# Patient Record
Sex: FEMALE | Race: BLACK OR AFRICAN AMERICAN | NOT HISPANIC OR LATINO | URBAN - METROPOLITAN AREA
[De-identification: names, ages, dates, MRNs, and addresses within clinical notes are randomized per-mention and may not be internally consistent; named-entity substitution may affect disease eponyms.]

---

## 2018-01-09 ENCOUNTER — OFFICE (OUTPATIENT)
Dept: URBAN - METROPOLITAN AREA CLINIC 11 | Facility: CLINIC | Age: 18
End: 2018-01-09

## 2018-01-09 VITALS
WEIGHT: 145 LBS | SYSTOLIC BLOOD PRESSURE: 134 MMHG | DIASTOLIC BLOOD PRESSURE: 87 MMHG | RESPIRATION RATE: 16 BRPM | HEART RATE: 92 BPM | HEIGHT: 59 IN

## 2018-01-09 DIAGNOSIS — R11.0 NAUSEA: ICD-10-CM

## 2018-01-09 DIAGNOSIS — R10.9 UNSPECIFIED ABDOMINAL PAIN: ICD-10-CM

## 2018-01-09 PROCEDURE — 99201 OFFICE OUTPATIENT NEW 10 MINUTES: CPT

## 2018-01-09 NOTE — SERVICENOTES
Will call back in 2 weeks with update.  If responds to medication, refills will be given at that time.

## 2018-01-09 NOTE — SERVICEHPINOTES
This 17-year-old  girl comes in with a several month history of recurring abdominal pains and nausea.  The nausea seems to come either after meals or when she is under pressure.  She never vomits, appetite is good but she does have bouts of constipation.  She also has asthma  and bronchitis.  She does take allergy medication and apparently also takes Zofran 4 milligrams p.r.n. nausea.  That medicine seems to work very well and controls her nausea.  Her abdominal pain can come at any time and she describes it in varying ways.  It could be cramping it could be bloating, sharp etc.